# Patient Record
Sex: MALE | Race: BLACK OR AFRICAN AMERICAN | NOT HISPANIC OR LATINO | URBAN - NONMETROPOLITAN AREA
[De-identification: names, ages, dates, MRNs, and addresses within clinical notes are randomized per-mention and may not be internally consistent; named-entity substitution may affect disease eponyms.]

---

## 2017-03-07 NOTE — PATIENT DISCUSSION
(H11.31) Conjunctival hemorrhage, right eye - Assesment : Examination revealed subconjunctival hemorrhage. OD. - Plan : Monitor for changes. Advised patient that this should resolve on its own. RV sooner with no improvement. Continue being followed by Opht[facility] in [facility]. RV if not resolved or new problems emerge.

## 2017-03-07 NOTE — PATIENT DISCUSSION
(Q54.171) Vitreous degeneration, left eye - Assesment : Examination revealed PVD OS. - Plan : Monitor for changes. Advised patient to call our office with decreased vision or an increase in flashes and/or floaters.

## 2017-03-07 NOTE — PATIENT DISCUSSION
(H25.013) Cortical age-related cataract, bilateral - Assesment : Examination revealed Cortical Senile Cataract. OU. - Plan : Monitor for changes. Advised patient to call our office with decreased vision or increased symptoms.

## 2020-07-27 ENCOUNTER — PREPPED CHART (OUTPATIENT)
Dept: URBAN - NONMETROPOLITAN AREA CLINIC 1 | Facility: CLINIC | Age: 65
End: 2020-07-27

## 2020-07-27 ENCOUNTER — IMPORTED ENCOUNTER (OUTPATIENT)
Dept: URBAN - NONMETROPOLITAN AREA CLINIC 1 | Facility: CLINIC | Age: 65
End: 2020-07-27

## 2020-07-27 PROBLEM — H43.813: Noted: 2020-07-27

## 2020-07-27 PROBLEM — H52.4: Noted: 2020-07-27

## 2020-07-27 PROBLEM — H25.813: Noted: 2020-07-27

## 2020-07-27 PROCEDURE — S0620 ROUTINE OPHTHALMOLOGICAL EXA: HCPCS

## 2020-07-27 NOTE — PATIENT DISCUSSION
Presbyopia OU - Discussed diagnosis in detail with patient- New glasses RX given today- Continue to monitor- RTC 1 year completeCombined Cataracts OU- Discussed diagnosis in detail with patient- Discussed signs and symptoms of progression- Discussed UV protection- Notreatment needed at this time - Continue to monitorPVD OU- Discussed findings of exam in detail with the patient. - The risk of retinal detachment in patients with PVDs was discussed with the patient and the warning signs of retinal detachment were carefully reviewed with the patient. - The patient was warned to return to the office or contact the ophthalmologist on call immediately if they experience signs of retinal detachment. - Continue to monitor.

## 2020-07-27 NOTE — PATIENT DISCUSSION
Presbyopia OU - Discussed diagnosis in detail with patient- New glasses RX given today- Continue to monitor- RTC 1 year complete Combined Cataracts OU- Discussed diagnosis in detail with patient- Discussed signs and symptoms of progression- Discussed UV protection- No treatment needed at this time - Continue to monitorPVD OU- Discussed findings of exam in detail with the patient. - The risk of retinal detachment in patients with PVDs was discussed with the patient and the warning signs of retinal detachment were carefully reviewed with the patient. - The patient was warned to return to the office or contact the ophthalmologist on call immediately if they experience signs of retinal detachment. - Continue to monitor

## 2022-03-16 ASSESSMENT — VISUAL ACUITY
OS_SC: 20/30
OD_SC: 20/25-2

## 2022-03-16 ASSESSMENT — TONOMETRY
OD_IOP_MMHG: 17
OS_IOP_MMHG: 15

## 2022-03-18 ENCOUNTER — COMPREHENSIVE EXAM (OUTPATIENT)
Dept: URBAN - NONMETROPOLITAN AREA CLINIC 1 | Facility: CLINIC | Age: 67
End: 2022-03-18

## 2022-03-18 DIAGNOSIS — H52.4: ICD-10-CM

## 2022-03-18 PROCEDURE — 92015 DETERMINE REFRACTIVE STATE: CPT

## 2022-03-18 PROCEDURE — 92014 COMPRE OPH EXAM EST PT 1/>: CPT

## 2022-03-18 ASSESSMENT — TONOMETRY
OS_IOP_MMHG: 17
OD_IOP_MMHG: 17

## 2022-03-18 ASSESSMENT — VISUAL ACUITY
OD_SC: 20/25
OS_SC: 20/25-1

## 2022-04-10 ASSESSMENT — VISUAL ACUITY
OS_CC: 20/30
OD_GLARE: 20/70
OD_CC: 20/25-2
OS_GLARE: 20/80-

## 2022-04-10 ASSESSMENT — TONOMETRY
OS_IOP_MMHG: 15
OD_IOP_MMHG: 17

## 2024-07-24 ENCOUNTER — COMPREHENSIVE EXAM (OUTPATIENT)
Dept: URBAN - NONMETROPOLITAN AREA CLINIC 1 | Facility: CLINIC | Age: 69
End: 2024-07-24

## 2024-07-24 DIAGNOSIS — H52.4: ICD-10-CM

## 2024-07-24 PROCEDURE — 92014 COMPRE OPH EXAM EST PT 1/>: CPT

## 2024-07-24 PROCEDURE — 92015 DETERMINE REFRACTIVE STATE: CPT

## 2024-07-24 ASSESSMENT — VISUAL ACUITY
OD_SC: 20/40
OD_BAT: 20/80
OS_BAT: 20/70
OU_SC: 20/25
OS_SC: 20/30

## 2024-07-24 ASSESSMENT — TONOMETRY
OD_IOP_MMHG: 16
OS_IOP_MMHG: 15

## 2024-10-29 ENCOUNTER — CONSULTATION/EVALUATION (OUTPATIENT)
Dept: URBAN - NONMETROPOLITAN AREA CLINIC 1 | Facility: CLINIC | Age: 69
End: 2024-10-29

## 2024-10-29 DIAGNOSIS — H25.813: ICD-10-CM

## 2024-10-29 PROCEDURE — 99214 OFFICE O/P EST MOD 30 MIN: CPT

## 2024-10-29 PROCEDURE — 92025 CPTRIZED CORNEAL TOPOGRAPHY: CPT | Mod: NC

## 2024-10-29 PROCEDURE — 92134 CPTRZ OPH DX IMG PST SGM RTA: CPT | Mod: NC

## 2024-10-29 PROCEDURE — 92136 OPHTHALMIC BIOMETRY: CPT

## 2024-11-07 ENCOUNTER — PRE-OP/H&P (OUTPATIENT)
Dept: URBAN - NONMETROPOLITAN AREA CLINIC 1 | Facility: CLINIC | Age: 69
End: 2024-11-07

## 2024-11-07 VITALS
BODY MASS INDEX: 19.4 KG/M2 | DIASTOLIC BLOOD PRESSURE: 76 MMHG | HEIGHT: 69.5 IN | WEIGHT: 134 LBS | SYSTOLIC BLOOD PRESSURE: 118 MMHG | HEART RATE: 70 BPM

## 2024-11-07 DIAGNOSIS — F41.9: ICD-10-CM

## 2024-11-07 DIAGNOSIS — K71.6: ICD-10-CM

## 2024-11-07 DIAGNOSIS — R56.9: ICD-10-CM

## 2024-11-07 DIAGNOSIS — E78.2: ICD-10-CM

## 2024-11-07 DIAGNOSIS — R51.9: ICD-10-CM

## 2024-11-07 DIAGNOSIS — Z01.818: ICD-10-CM

## 2024-11-07 PROCEDURE — 99213 OFFICE O/P EST LOW 20 MIN: CPT

## 2024-11-19 ENCOUNTER — SURGERY/PROCEDURE (OUTPATIENT)
Facility: LOCATION | Age: 69
End: 2024-11-19

## 2024-11-19 DIAGNOSIS — H25.811: ICD-10-CM

## 2024-11-19 PROCEDURE — 68841 INSJ RX ELUT IMPLT LAC CANAL: CPT | Mod: 51,RT

## 2024-11-19 PROCEDURE — 66984 XCAPSL CTRC RMVL W/O ECP: CPT

## 2024-11-20 ENCOUNTER — POST OP/EVAL OF SECOND EYE (OUTPATIENT)
Dept: URBAN - NONMETROPOLITAN AREA CLINIC 1 | Facility: CLINIC | Age: 69
End: 2024-11-20

## 2024-11-20 DIAGNOSIS — Z98.41: ICD-10-CM

## 2024-11-20 DIAGNOSIS — H25.812: ICD-10-CM

## 2024-11-20 PROCEDURE — 99024 POSTOP FOLLOW-UP VISIT: CPT

## 2024-11-27 ENCOUNTER — POST-OP (OUTPATIENT)
Dept: URBAN - NONMETROPOLITAN AREA CLINIC 1 | Facility: CLINIC | Age: 69
End: 2024-11-27

## 2024-11-27 DIAGNOSIS — Z98.41: ICD-10-CM

## 2024-11-27 PROCEDURE — 99024 POSTOP FOLLOW-UP VISIT: CPT

## 2024-12-03 ENCOUNTER — SURGERY/PROCEDURE (OUTPATIENT)
Age: 69
End: 2024-12-03

## 2024-12-03 DIAGNOSIS — H25.812: ICD-10-CM

## 2024-12-03 PROCEDURE — 66984 XCAPSL CTRC RMVL W/O ECP: CPT | Mod: 79,LT

## 2024-12-03 PROCEDURE — 68841 INSJ RX ELUT IMPLT LAC CANAL: CPT | Mod: 51,LT,79,LT

## 2024-12-04 ENCOUNTER — POST-OP (OUTPATIENT)
Age: 69
End: 2024-12-04

## 2024-12-04 DIAGNOSIS — Z98.42: ICD-10-CM

## 2024-12-04 DIAGNOSIS — Z98.41: ICD-10-CM

## 2024-12-04 PROCEDURE — 99024 POSTOP FOLLOW-UP VISIT: CPT

## 2024-12-11 ENCOUNTER — POST-OP (OUTPATIENT)
Age: 69
End: 2024-12-11

## 2024-12-11 DIAGNOSIS — Z98.42: ICD-10-CM

## 2024-12-11 DIAGNOSIS — Z98.41: ICD-10-CM

## 2024-12-11 PROCEDURE — 99024 POSTOP FOLLOW-UP VISIT: CPT

## 2025-01-03 ENCOUNTER — POST-OP (OUTPATIENT)
Age: 70
End: 2025-01-03

## 2025-01-03 DIAGNOSIS — Z98.42: ICD-10-CM

## 2025-01-03 DIAGNOSIS — H52.4: ICD-10-CM

## 2025-01-03 DIAGNOSIS — Z98.41: ICD-10-CM

## 2025-01-03 PROCEDURE — 99024 POSTOP FOLLOW-UP VISIT: CPT

## 2025-01-03 PROCEDURE — 92015 DETERMINE REFRACTIVE STATE: CPT | Mod: NC
